# Patient Record
Sex: FEMALE | Race: WHITE | NOT HISPANIC OR LATINO | Employment: FULL TIME | URBAN - NONMETROPOLITAN AREA
[De-identification: names, ages, dates, MRNs, and addresses within clinical notes are randomized per-mention and may not be internally consistent; named-entity substitution may affect disease eponyms.]

---

## 2022-07-21 ENCOUNTER — OFFICE VISIT (OUTPATIENT)
Dept: BEHAVIORAL HEALTH | Facility: CLINIC | Age: 22
End: 2022-07-21

## 2022-07-21 DIAGNOSIS — F41.1 GENERALIZED ANXIETY DISORDER: Primary | ICD-10-CM

## 2022-07-21 PROCEDURE — 90791 PSYCH DIAGNOSTIC EVALUATION: CPT | Performed by: PSYCHOLOGIST

## 2022-07-21 NOTE — PROGRESS NOTES
7/21/2022    This provider is located at the [facility] (through [facility]), [address], [place]. [address] using a secure [employer] Video Visit through [employer]. Patient is being seen remotely via telehealth at their home address in [place], and stated they are in a secure environment for this session. The patient's condition being diagnosed/treated is appropriate for telemedicine. The provider identified herself as well as her credentials.   The patient, and/or patients guardian, consent to be seen remotely, and when consent is given they understand that the consent allows for patient identifiable information to be sent to a third party as needed.   They may refuse to be seen remotely at any time. The electronic data is encrypted and password protected, and the patient and/or guardian has been advised of the potential risks to privacy not withstanding such measures.    You have chosen to receive care through a telehealth visit.  Do you consent to use a video/audio connection for your medical care today? Yes    Mellissa Quezada, a 21 y.o. female, was seen today for initial appointment lasting 45 minutes.  Patient is referred by CHRIS Garcia at [facility] for an assessment related to ADHD.    She denied a history of emotional trauma.    SUBJECTIVE:  She is experiencing: disorganization, lacks focus, has a hard time paying attention to details, tends to make careless mistakes, seems careless, has trouble staying on topic while talking, not listening to others, not following social rules, forgetful about daily activities (for example, missing appointments, forgetting to bring lunch), easily distracted by things like trivial noises or events that are usually ignored by others, has a hard time getting along with others because they can’t read people’s feelings and moods, daydreams, fidgets and squirms when seated, gets up frequently to walk or run  "around, panic attacks, and low tolerance to stress.     The symptoms began in childhood.    The symptoms began to impair her academic progress 18 months ago.    She is prescribed Wellbutrin 300 MG.     FAMILY HISTORY:  ADHD- none   MDD- mother, maternal great grandmother  Anxiety- mother, sister, maternal grandfather  Alcohol- none  Drug- none    She described her physical health as \"good\".  She described her sleep as \"pretty good\" (6-8 hours x day).    Her parents  in 1995.  The relationship produced 2 daughters ('98 and '02).  Her parents  in 2007.  She lived in the home of her mother.  Her mother did not remarry.      Her father was a .  He remarried several times. She stopped visiting her father in 2009.     She has never .  She has no children.    She graduated from [employer] in 2017. She graduated from [employer] with a BS in [employer] and Computer Science in 2021.  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    MENTAL STATUS:  The patient’s speech was WNL (rate, volume, articulation, coherence, etc.).  The patient was oriented to time, place, and person.  The patient’s memory (remote and recent) was intact.  The patient’s attention and concentration were WNL.  The patient’s mood and affect were congruent.    The patient’s thought content did not appear to possess delusions or hallucinations. These results do not appear to be significantly influenced by the effects of visual, auditory, or motor deficits, environmental/economic or cultural differences.    The patient denied SI/HI.      strengths: She is polite  weakness: She is unable to manage symptoms  short term goal: She will reduce symptoms from 4 x week to 1 x week  long term goal:She will eliminate symptoms     DIAGNOSIS:    ICD-10-CM ICD-9-CM   1. Generalized anxiety disorder  F41.1 300.02       ASSESSMENT PLAN:  She will complete the " assessment.          This document has been electronically signed by Harvey Mandujano, PhD on July 21, 2022 10:54 CDT

## 2022-09-26 ENCOUNTER — OFFICE VISIT (OUTPATIENT)
Dept: BEHAVIORAL HEALTH | Facility: CLINIC | Age: 22
End: 2022-09-26

## 2022-09-26 DIAGNOSIS — F90.2 ATTENTION DEFICIT HYPERACTIVITY DISORDER, COMBINED TYPE: ICD-10-CM

## 2022-09-26 PROCEDURE — 96130 PSYCL TST EVAL PHYS/QHP 1ST: CPT | Performed by: PSYCHOLOGIST

## 2022-10-10 NOTE — PROGRESS NOTES
[facility]  [address] [place] [address]  PHONE : [phone]  FAX: [phone]      DATE:  09/26/2022    PATIENT:   Mellissa Quezada 2000                                 MEDICAL RECORD #:  [id number]  Chronological age: 21 y.o.   Date of Psychological Assessment:   Examiner: Harvey Mandujano, PhD   Licensed Psychologist      Tests Administered:  Dory Brief Intelligence Test- Second Edition (KBIT-2)  Wide Range Achievement Test- Fifth Edition (WRAT-5)  Marcus Depression Inventory (BDI-2)  Marcus Anxiety inventory (NANO)  Vernon’s Incomplete Sentence Blank- Adult (RISB-A)  Clinical Interview and Review of Records    Identification and Referral Information:   Ms. Quezada was referred by CHRIS Garcia at [facility] for an assessment related to ADHD.     Presenting Problem and Background Information:  Ms. Quezada is experiencing: disorganization, lacks focus, has a hard time paying attention to details, tends to make careless mistakes, seems careless, has trouble staying on topic while talking, not listening to others, not following social rules, forgetful about daily activities (for example, missing appointments, forgetting to bring lunch), easily distracted by things like trivial noises or events that are usually ignored by others, has a hard time getting along with others because they can’t read people’s feelings and moods, daydreams, fidgets and squirms when seated, gets up frequently to walk or run around, panic attacks, and low tolerance to stress.      The symptoms began in childhood.     The symptoms began to impair her academic progress 18 months ago.     She is prescribed Wellbutrin 300 MG.     Behavioral Observations:  Mellissa was alert and oriented to time, place, and person. Her thought content did not appear to possess delusions or hallucinations. These results do not appear to be significantly influenced by the effects of  visual, auditory, or motor deficits, environmental/economic or cultural differences. The following results are thought to be valid.      Test Results:  The interpretive information in this report should be viewed as only one source of hypotheses and no decision should be based solely on this information. This data should be integrated with all other sources of information in reaching professional decisions about the individual. This report is confidential and intended for use by qualified professionals only.    KBIT-2  The KBIT-2 is a brief, individually administered measure of verbal and nonverbal intelligence for children, adolescents, and adults, spanning the ages from 4 to 90 years.    Ms. Quezada obtained an IQ Composite Standard Score of 109 which yielded a Percentile of 73 and places her in the Average range of intellectual functioning. A Percentile of 73 means that she scored as well as or better than 73 out of 100 peers in the sample population. At a 90% Confidence Interval her true IQ Score falls between 102 and 115.  Individuals with similar scores learn material at a similar rate compared to same age peers and require a similar degree of examples, repetition and guided practice as same-aged peers.    The 42 point difference between the Verbal Standard Score (128, Above Average, 97%ile 18.6 years age equivalent) and the Nonverbal Standard Score (86, Average, 18%ile, 10.8 years age equivalent) was significant and suggests that her verbal reasoning abilities are better developed compared to her nonverbal reasoning abilities.    WRAT-5   The WRAT-5 is a screening measure of academic achievement.  Ms. Quezada graduated from [employer] in 2017. She graduated from [employer] with a BS in [employer] and Computer Science in 2021.  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      The results of the WRAT-5 indicate she is performing at a Grade  Score of >12.9 standard score of 89 and a percentile rank of 23.  On the Spelling Subtest, the examinee obtained a Standard Score of 125 (95%ile) and a Grade Score of >12.9. On the Math Computation Subtest, the examinee obtained a Standard Score of 98 (39%ile) and a Grade Score of 12.4.  On the Sentence Comprehension, the examinee obtained a Standard Score of 131 (98%ile) and a Grade Score of >12.9. On the Reading Composite the examinee obtained a Standard Score of 111 (77%ile).        The scores on the WRAT-5 are commensurate with her cognitive ability.     Brown ADD Scales  The Brown ADD Scales help to assess a wide range of symptoms of executive function impairments associated with ADHD/ADD.  The Brown ADD Scales include 40 items that assess five clusters of ADHD-related executive function impairments.      Ms. Quezada, her coworker, and her friend completed the rating scales.  T-Scores 60 and above are considered clinically significant.  She obtained the following T-scores:      Activation Attention Effort Affect Memory Total Score   Self 84 64 71 77 75 79   Coworker   77 71 66 52 68 68   Friend    51 56 68 67 55 61     The results of the Brown ADD Scales indicate symptoms of ADHD.  However, no data exists to establish the onset of symptoms.  A provisional diagnosis is warranted.      BDI-2  The BDI-2 is a 21 item, self-report instrument for measuring the severity of depression in adults and adolescents aged 13 years or older. The BDI-2 was developed for the assessment of symptoms corresponding to criteria for diagnosing depressive disorders listed in the American Psychiatric Association's Diagnostic and Statistical Manual of Mental Disorders (DSM-IV-TR). Scores above 28 are considered “severe”, 20-28 are “moderate”, 14-19 are “mild”, and 0-13 are “minimal”.        Ms. Quezaad obtained a score of 18 on the BDI-2. This score falls in the “mild” range of depressive symptoms.  She is not endorsing clinically  "significant symptoms of depression.    NANO  The Marcus Anxiety Inventory (NANO) is a widely used 21-item self-report inventory used to assess anxiety levels in adults and adolescents. It has been used in multiple studies, including in treatment-outcome studies for individuals who have experienced traumas. The age range for the measure is from 17 to 80 years.        The NANO discriminates between anxious and non-anxious groups.  The inventory contains 21 items rated from 0 to 3 by the taker, with a total possible score of 63 points. The items are experiences related to anxiety such as \"Fear of worst happening\" or \"Heart pounding/racing\".  Total scores 0-7 = minimal, 8-15 = mild, 16-25 = moderate, 26+ = severe.  Scores of 26 and above indicate statistically significant levels of anxiety.    Ms. Quezada obtained a score of 14 on the NANO.  This score falls in the “mild” level of anxiety symptoms.   She is not endorsing clinically significant symptoms of anxiety.     RISB-A  Vernon’s Incomplete Sentence Blank- Adult is a 40 item fill-in-the blank project test designed to gather psychological data in the assessment process.  The results of the RISB-A indicate low tolerance to stress, indecisiveness, and poor time management skills.      Diagnosis  Problems Addressed this Visit    None  Visit Diagnoses     Attention deficit hyperactivity disorder, combined type          Diagnoses       Codes Comments    Attention deficit hyperactivity disorder, combined type     ICD-10-CM: F90.2  ICD-9-CM: 314.01         Summary:   Ms. Quezada was referred by CHRIS Garcia at [facility] for an assessment related to ADHD.    The results of the KBIT-2 indicate she is performing in the Average range of cognitive ability (109 IQ Composite).The results of the WRAT-5 indicate the following grade scores: >12.9 in Word Reading, >12.9 in Spelling, 12.4 in Math Computation, and >12.9 in Sentence Comprehension. The results of the Brown " ADD indicate ADHD.  The results of the BDI-2 indicate “mild” depression. The results of the NANO indicate “mild” anxiety.  The results of the RISB-A indicate low tolerance to stress, indecisiveness, and poor time management skills.    Recommendations:  It is the recommendation of the undersigned that Mellissa Quezada receive:   1. ongoing counseling as necessary to address ADHD  2. psychiatric treatment as needed  3. educational and vocational assistance as necessary     I spent 60 minutes in direct face to face contact with patient.  Greater than 50% of this time was spent counseling patient and discussing plan of care.  Copied text within this note has been reviewed and is accurate as of 02/06/23            This document has been electronically signed by Harvey Mandujano, PhD on October 10, 2022 16:49 CDT        Harvey Mandujano, PhD   Licensed Psychologist